# Patient Record
(demographics unavailable — no encounter records)

---

## 2025-01-08 NOTE — HEALTH RISK ASSESSMENT
[With Family] : lives with family [Employed] : employed [] :  [# Of Children ___] : has [unfilled] children [No] : In the past 12 months have you used drugs other than those required for medical reasons? No [No falls in past year] : Patient reported no falls in the past year [0] : 2) Feeling down, depressed, or hopeless: Not at all (0) [PHQ-2 Negative - No further assessment needed] : PHQ-2 Negative - No further assessment needed [[id number]] : 0 [Patient reported colonoscopy was normal] : Patient reported colonoscopy was normal [HIV test declined] : HIV test declined [Reports changes in hearing] : Reports no changes in hearing [Reports changes in vision] : Reports no changes in vision [Reports changes in dental health] : Reports no changes in dental health [ColonoscopyDate] : 03/22 [FreeTextEntry2] : DANIEL Wren [Never] : Never

## 2025-01-08 NOTE — HISTORY OF PRESENT ILLNESS
[FreeTextEntry1] : Annual physical and toe pain [de-identified] : 40-year-old male with no significant past medical history who presents for an annual physical/initial visit. he c/o left great toe pain for a few days. The pain is a shooting pain that is not severe. No swelling. he had more significant pain of the same toe about 1 month ago. At the time the pain what shooting as well. the pain resolves on its own  His father  of prostate cancer  exercises 2-3 times per week Diet is balanced

## 2025-01-08 NOTE — HISTORY OF PRESENT ILLNESS
[FreeTextEntry1] : Annual physical and toe pain [de-identified] : 40-year-old male with no significant past medical history who presents for an annual physical/initial visit. he c/o left great toe pain for a few days. The pain is a shooting pain that is not severe. No swelling. he had more significant pain of the same toe about 1 month ago. At the time the pain what shooting as well. the pain resolves on its own  His father  of prostate cancer  exercises 2-3 times per week Diet is balanced

## 2025-01-08 NOTE — PHYSICAL EXAM
[No Acute Distress] : no acute distress [Well Nourished] : well nourished [Well Developed] : well developed [Well-Appearing] : well-appearing [Normal Sclera/Conjunctiva] : normal sclera/conjunctiva [PERRL] : pupils equal round and reactive to light [EOMI] : extraocular movements intact [Normal Outer Ear/Nose] : the outer ears and nose were normal in appearance [Normal Oropharynx] : the oropharynx was normal [Normal TMs] : both tympanic membranes were normal [No JVD] : no jugular venous distention [No Lymphadenopathy] : no lymphadenopathy [Supple] : supple [Thyroid Normal, No Nodules] : the thyroid was normal and there were no nodules present [No Respiratory Distress] : no respiratory distress  [No Accessory Muscle Use] : no accessory muscle use [Clear to Auscultation] : lungs were clear to auscultation bilaterally [Normal Rate] : normal rate  [Regular Rhythm] : with a regular rhythm [Normal S1, S2] : normal S1 and S2 [No Murmur] : no murmur heard [No Carotid Bruits] : no carotid bruits [No Varicosities] : no varicosities [Pedal Pulses Present] : the pedal pulses are present [No Edema] : there was no peripheral edema [No Extremity Clubbing/Cyanosis] : no extremity clubbing/cyanosis [Soft] : abdomen soft [Non Tender] : non-tender [Non-distended] : non-distended [No Masses] : no abdominal mass palpated [No HSM] : no HSM [Normal Bowel Sounds] : normal bowel sounds [Normal Posterior Cervical Nodes] : no posterior cervical lymphadenopathy [Normal Anterior Cervical Nodes] : no anterior cervical lymphadenopathy [No CVA Tenderness] : no CVA  tenderness [No Spinal Tenderness] : no spinal tenderness [No Joint Swelling] : no joint swelling [Grossly Normal Strength/Tone] : grossly normal strength/tone [No Rash] : no rash [Coordination Grossly Intact] : coordination grossly intact [No Focal Deficits] : no focal deficits [Normal Gait] : normal gait [Deep Tendon Reflexes (DTR)] : deep tendon reflexes were 2+ and symmetric [Normal Affect] : the affect was normal [Alert and Oriented x3] : oriented to person, place, and time [Normal Insight/Judgement] : insight and judgment were intact [de-identified] : no tenderness or swelling of left great toe

## 2025-01-08 NOTE — PLAN
[FreeTextEntry1] : Toe pain Doubt gout. However will check a uric acid will get an Xray  Health Maintenance Depression screening negative  BP is controlled Maintain heathy lifestyle with balanced diet and exercise Check labs today, including CBC, CMP, TSH, HbA1c, lipids, PSA. Declined HIV testing. Blood collected in office.  f/u to be based on above

## 2025-01-08 NOTE — HEALTH RISK ASSESSMENT
[With Family] : lives with family [Employed] : employed [] :  [# Of Children ___] : has [unfilled] children [No] : In the past 12 months have you used drugs other than those required for medical reasons? No [No falls in past year] : Patient reported no falls in the past year [0] : 2) Feeling down, depressed, or hopeless: Not at all (0) [PHQ-2 Negative - No further assessment needed] : PHQ-2 Negative - No further assessment needed [[id number]] : 0 [Patient reported colonoscopy was normal] : Patient reported colonoscopy was normal [HIV test declined] : HIV test declined [Reports changes in hearing] : Reports no changes in hearing [Reports changes in vision] : Reports no changes in vision [Reports changes in dental health] : Reports no changes in dental health [ColonoscopyDate] : 03/22 [FreeTextEntry2] : DANIEL Wren [Never] : Never

## 2025-01-08 NOTE — PHYSICAL EXAM
[No Acute Distress] : no acute distress [Well Nourished] : well nourished [Well Developed] : well developed [Well-Appearing] : well-appearing [Normal Sclera/Conjunctiva] : normal sclera/conjunctiva [PERRL] : pupils equal round and reactive to light [EOMI] : extraocular movements intact [Normal Outer Ear/Nose] : the outer ears and nose were normal in appearance [Normal Oropharynx] : the oropharynx was normal [Normal TMs] : both tympanic membranes were normal [No JVD] : no jugular venous distention [No Lymphadenopathy] : no lymphadenopathy [Supple] : supple [Thyroid Normal, No Nodules] : the thyroid was normal and there were no nodules present [No Respiratory Distress] : no respiratory distress  [No Accessory Muscle Use] : no accessory muscle use [Clear to Auscultation] : lungs were clear to auscultation bilaterally [Normal Rate] : normal rate  [Regular Rhythm] : with a regular rhythm [Normal S1, S2] : normal S1 and S2 [No Murmur] : no murmur heard [No Carotid Bruits] : no carotid bruits [No Varicosities] : no varicosities [Pedal Pulses Present] : the pedal pulses are present [No Edema] : there was no peripheral edema [No Extremity Clubbing/Cyanosis] : no extremity clubbing/cyanosis [Soft] : abdomen soft [Non Tender] : non-tender [Non-distended] : non-distended [No Masses] : no abdominal mass palpated [No HSM] : no HSM [Normal Bowel Sounds] : normal bowel sounds [Normal Posterior Cervical Nodes] : no posterior cervical lymphadenopathy [Normal Anterior Cervical Nodes] : no anterior cervical lymphadenopathy [No CVA Tenderness] : no CVA  tenderness [No Spinal Tenderness] : no spinal tenderness [No Joint Swelling] : no joint swelling [Grossly Normal Strength/Tone] : grossly normal strength/tone [No Rash] : no rash [Coordination Grossly Intact] : coordination grossly intact [No Focal Deficits] : no focal deficits [Normal Gait] : normal gait [Deep Tendon Reflexes (DTR)] : deep tendon reflexes were 2+ and symmetric [Normal Affect] : the affect was normal [Alert and Oriented x3] : oriented to person, place, and time [Normal Insight/Judgement] : insight and judgment were intact [de-identified] : no tenderness or swelling of left great toe

## 2025-01-14 NOTE — ASSESSMENT
[FreeTextEntry1] : Toe pain  ROS and PE otherwise unremarkable for underlying classic CTD/ systemic autoimmune disease/ inflammatory arthritis  - labs/ MR reviewed - Unclear etiology. Consider podiatry vs ortho foot evaluation if symptoms continue  Discussed treatment plan with the patient. The patient was given the opportunity to ask questions and all questions were answered to their satisfaction.

## 2025-01-14 NOTE — HISTORY OF PRESENT ILLNESS
[FreeTextEntry1] : 40 year old male with PMH as listed below presents today for an initial evaluation for joint pain  History: - Sharp, stabbing pain in toe: onset 1 month ago - Initial episodes: pain lasting 20 seconds, severe intensity requiring stopping activities - Progression: pain improved after 1-2 days, recurred week later with less intensity - Current: episodes continue but less severe, lasting 5-10 seconds - No associated warmth or redness or swelling - Multiple episodes throughout day - Pain occurs spontaneously, even at rest - No trauma to affected toe  Past Medical History: - Asthma - Back pain: transient episode after lifting, resolved - No diabetes - No kidney stones - No hx of gout  Medications: - Albuterol PRN - No regular OTC medications  Social History: - Occupation: Nurse - No smoking - No alcohol - No drug use  Labs/ chart reviewed